# Patient Record
Sex: FEMALE | Race: OTHER | HISPANIC OR LATINO
[De-identification: names, ages, dates, MRNs, and addresses within clinical notes are randomized per-mention and may not be internally consistent; named-entity substitution may affect disease eponyms.]

---
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
## 2020-06-04 NOTE — DISCHARGE NOTE ADULT - PATIENT PORTAL LINK FT
Spine appears normal, range of motion is not limited, no muscle or joint tenderness, legs with minimal edema, pulses intact b/l You can access the [facility] Patient Portal, offered by [facility], by registering with the following website: [email or web address]

## 2021-06-18 ENCOUNTER — EMERGENCY (EMERGENCY)
Facility: HOSPITAL | Age: 21
LOS: 1 days | Discharge: HOME | End: 2021-06-18
Attending: STUDENT IN AN ORGANIZED HEALTH CARE EDUCATION/TRAINING PROGRAM
Payer: MEDICAID

## 2021-06-18 VITALS
OXYGEN SATURATION: 99 % | RESPIRATION RATE: 16 BRPM | WEIGHT: 167.99 LBS | HEIGHT: 62 IN | HEART RATE: 58 BPM | SYSTOLIC BLOOD PRESSURE: 138 MMHG | DIASTOLIC BLOOD PRESSURE: 80 MMHG | TEMPERATURE: 99 F

## 2021-06-18 VITALS
HEART RATE: 93 BPM | TEMPERATURE: 98 F | RESPIRATION RATE: 16 BRPM | SYSTOLIC BLOOD PRESSURE: 104 MMHG | DIASTOLIC BLOOD PRESSURE: 63 MMHG | OXYGEN SATURATION: 97 %

## 2021-06-18 DIAGNOSIS — Z98.890 OTHER SPECIFIED POSTPROCEDURAL STATES: Chronic | ICD-10-CM

## 2021-06-18 PROBLEM — K66.1 HEMOPERITONEUM: Chronic | Status: ACTIVE | Noted: 2018-10-09

## 2021-06-18 PROBLEM — N83.209 UNSPECIFIED OVARIAN CYST, UNSPECIFIED SIDE: Chronic | Status: ACTIVE | Noted: 2018-10-09

## 2021-06-18 LAB
ALBUMIN SERPL ELPH-MCNC: 3.6 G/DL — SIGNIFICANT CHANGE UP (ref 3.5–5)
ALP SERPL-CCNC: 52 U/L — SIGNIFICANT CHANGE UP (ref 40–120)
ALT FLD-CCNC: 17 U/L DA — SIGNIFICANT CHANGE UP (ref 10–60)
ANION GAP SERPL CALC-SCNC: 5 MMOL/L — SIGNIFICANT CHANGE UP (ref 5–17)
APPEARANCE UR: CLEAR — SIGNIFICANT CHANGE UP
AST SERPL-CCNC: 11 U/L — SIGNIFICANT CHANGE UP (ref 10–40)
BASOPHILS # BLD AUTO: 0.11 K/UL — SIGNIFICANT CHANGE UP (ref 0–0.2)
BASOPHILS NFR BLD AUTO: 1.2 % — SIGNIFICANT CHANGE UP (ref 0–2)
BILIRUB SERPL-MCNC: 0.1 MG/DL — LOW (ref 0.2–1.2)
BILIRUB UR-MCNC: NEGATIVE — SIGNIFICANT CHANGE UP
BUN SERPL-MCNC: 7 MG/DL — SIGNIFICANT CHANGE UP (ref 7–18)
CALCIUM SERPL-MCNC: 8.9 MG/DL — SIGNIFICANT CHANGE UP (ref 8.4–10.5)
CHLORIDE SERPL-SCNC: 108 MMOL/L — SIGNIFICANT CHANGE UP (ref 96–108)
CO2 SERPL-SCNC: 27 MMOL/L — SIGNIFICANT CHANGE UP (ref 22–31)
COLOR SPEC: YELLOW — SIGNIFICANT CHANGE UP
CREAT SERPL-MCNC: 0.63 MG/DL — SIGNIFICANT CHANGE UP (ref 0.5–1.3)
D DIMER BLD IA.RAPID-MCNC: 200 NG/ML DDU — SIGNIFICANT CHANGE UP
DIFF PNL FLD: ABNORMAL
EOSINOPHIL # BLD AUTO: 0.11 K/UL — SIGNIFICANT CHANGE UP (ref 0–0.5)
EOSINOPHIL NFR BLD AUTO: 1.2 % — SIGNIFICANT CHANGE UP (ref 0–6)
EPI CELLS # UR: SIGNIFICANT CHANGE UP /HPF
GLUCOSE SERPL-MCNC: 72 MG/DL — SIGNIFICANT CHANGE UP (ref 70–99)
GLUCOSE UR QL: NEGATIVE — SIGNIFICANT CHANGE UP
HCG SERPL-ACNC: <1 MIU/ML — SIGNIFICANT CHANGE UP
HCT VFR BLD CALC: 40.9 % — SIGNIFICANT CHANGE UP (ref 34.5–45)
HGB BLD-MCNC: 13.3 G/DL — SIGNIFICANT CHANGE UP (ref 11.5–15.5)
IMM GRANULOCYTES NFR BLD AUTO: 0.2 % — SIGNIFICANT CHANGE UP (ref 0–1.5)
KETONES UR-MCNC: NEGATIVE — SIGNIFICANT CHANGE UP
LACTATE SERPL-SCNC: 1.3 MMOL/L — SIGNIFICANT CHANGE UP (ref 0.7–2)
LEUKOCYTE ESTERASE UR-ACNC: ABNORMAL
LIDOCAIN IGE QN: 135 U/L — SIGNIFICANT CHANGE UP (ref 73–393)
LYMPHOCYTES # BLD AUTO: 2.14 K/UL — SIGNIFICANT CHANGE UP (ref 1–3.3)
LYMPHOCYTES # BLD AUTO: 23.1 % — SIGNIFICANT CHANGE UP (ref 13–44)
MCHC RBC-ENTMCNC: 27 PG — SIGNIFICANT CHANGE UP (ref 27–34)
MCHC RBC-ENTMCNC: 32.5 GM/DL — SIGNIFICANT CHANGE UP (ref 32–36)
MCV RBC AUTO: 83.1 FL — SIGNIFICANT CHANGE UP (ref 80–100)
MONOCYTES # BLD AUTO: 0.78 K/UL — SIGNIFICANT CHANGE UP (ref 0–0.9)
MONOCYTES NFR BLD AUTO: 8.4 % — SIGNIFICANT CHANGE UP (ref 2–14)
NEUTROPHILS # BLD AUTO: 6.11 K/UL — SIGNIFICANT CHANGE UP (ref 1.8–7.4)
NEUTROPHILS NFR BLD AUTO: 65.9 % — SIGNIFICANT CHANGE UP (ref 43–77)
NITRITE UR-MCNC: NEGATIVE — SIGNIFICANT CHANGE UP
NRBC # BLD: 0 /100 WBCS — SIGNIFICANT CHANGE UP (ref 0–0)
PH UR: 5 — SIGNIFICANT CHANGE UP (ref 5–8)
PLATELET # BLD AUTO: 256 K/UL — SIGNIFICANT CHANGE UP (ref 150–400)
POTASSIUM SERPL-MCNC: 3.7 MMOL/L — SIGNIFICANT CHANGE UP (ref 3.5–5.3)
POTASSIUM SERPL-SCNC: 3.7 MMOL/L — SIGNIFICANT CHANGE UP (ref 3.5–5.3)
PROT SERPL-MCNC: 8.1 G/DL — SIGNIFICANT CHANGE UP (ref 6–8.3)
PROT UR-MCNC: NEGATIVE — SIGNIFICANT CHANGE UP
RAPID RVP RESULT: SIGNIFICANT CHANGE UP
RBC # BLD: 4.92 M/UL — SIGNIFICANT CHANGE UP (ref 3.8–5.2)
RBC # FLD: 14.7 % — HIGH (ref 10.3–14.5)
RBC CASTS # UR COMP ASSIST: SIGNIFICANT CHANGE UP /HPF (ref 0–2)
SARS-COV-2 RNA SPEC QL NAA+PROBE: SIGNIFICANT CHANGE UP
SODIUM SERPL-SCNC: 140 MMOL/L — SIGNIFICANT CHANGE UP (ref 135–145)
SP GR SPEC: 1.01 — SIGNIFICANT CHANGE UP (ref 1.01–1.02)
TROPONIN I SERPL-MCNC: <0.015 NG/ML — SIGNIFICANT CHANGE UP (ref 0–0.04)
UROBILINOGEN FLD QL: NEGATIVE — SIGNIFICANT CHANGE UP
WBC # BLD: 9.27 K/UL — SIGNIFICANT CHANGE UP (ref 3.8–10.5)
WBC # FLD AUTO: 9.27 K/UL — SIGNIFICANT CHANGE UP (ref 3.8–10.5)
WBC UR QL: SIGNIFICANT CHANGE UP /HPF (ref 0–5)

## 2021-06-18 PROCEDURE — 83690 ASSAY OF LIPASE: CPT

## 2021-06-18 PROCEDURE — 36415 COLL VENOUS BLD VENIPUNCTURE: CPT

## 2021-06-18 PROCEDURE — 81001 URINALYSIS AUTO W/SCOPE: CPT

## 2021-06-18 PROCEDURE — 84484 ASSAY OF TROPONIN QUANT: CPT

## 2021-06-18 PROCEDURE — 71046 X-RAY EXAM CHEST 2 VIEWS: CPT

## 2021-06-18 PROCEDURE — 99284 EMERGENCY DEPT VISIT MOD MDM: CPT | Mod: 25

## 2021-06-18 PROCEDURE — 83605 ASSAY OF LACTIC ACID: CPT

## 2021-06-18 PROCEDURE — 0225U NFCT DS DNA&RNA 21 SARSCOV2: CPT

## 2021-06-18 PROCEDURE — 85025 COMPLETE CBC W/AUTO DIFF WBC: CPT

## 2021-06-18 PROCEDURE — 93005 ELECTROCARDIOGRAM TRACING: CPT

## 2021-06-18 PROCEDURE — 96374 THER/PROPH/DIAG INJ IV PUSH: CPT

## 2021-06-18 PROCEDURE — 84702 CHORIONIC GONADOTROPIN TEST: CPT

## 2021-06-18 PROCEDURE — 96375 TX/PRO/DX INJ NEW DRUG ADDON: CPT

## 2021-06-18 PROCEDURE — 71046 X-RAY EXAM CHEST 2 VIEWS: CPT | Mod: 26

## 2021-06-18 PROCEDURE — 99285 EMERGENCY DEPT VISIT HI MDM: CPT

## 2021-06-18 PROCEDURE — 85379 FIBRIN DEGRADATION QUANT: CPT

## 2021-06-18 PROCEDURE — 80053 COMPREHEN METABOLIC PANEL: CPT

## 2021-06-18 RX ORDER — SODIUM CHLORIDE 9 MG/ML
1000 INJECTION INTRAMUSCULAR; INTRAVENOUS; SUBCUTANEOUS ONCE
Refills: 0 | Status: COMPLETED | OUTPATIENT
Start: 2021-06-18 | End: 2021-06-18

## 2021-06-18 RX ORDER — KETOROLAC TROMETHAMINE 30 MG/ML
15 SYRINGE (ML) INJECTION ONCE
Refills: 0 | Status: DISCONTINUED | OUTPATIENT
Start: 2021-06-18 | End: 2021-06-18

## 2021-06-18 RX ORDER — ONDANSETRON 8 MG/1
4 TABLET, FILM COATED ORAL ONCE
Refills: 0 | Status: COMPLETED | OUTPATIENT
Start: 2021-06-18 | End: 2021-06-18

## 2021-06-18 RX ORDER — FAMOTIDINE 10 MG/ML
20 INJECTION INTRAVENOUS ONCE
Refills: 0 | Status: COMPLETED | OUTPATIENT
Start: 2021-06-18 | End: 2021-06-18

## 2021-06-18 RX ADMIN — Medication 15 MILLIGRAM(S): at 15:18

## 2021-06-18 RX ADMIN — SODIUM CHLORIDE 2000 MILLILITER(S): 9 INJECTION INTRAMUSCULAR; INTRAVENOUS; SUBCUTANEOUS at 13:55

## 2021-06-18 RX ADMIN — Medication 15 MILLIGRAM(S): at 14:48

## 2021-06-18 RX ADMIN — ONDANSETRON 4 MILLIGRAM(S): 8 TABLET, FILM COATED ORAL at 13:55

## 2021-06-18 RX ADMIN — SODIUM CHLORIDE 1000 MILLILITER(S): 9 INJECTION INTRAMUSCULAR; INTRAVENOUS; SUBCUTANEOUS at 15:42

## 2021-06-18 RX ADMIN — FAMOTIDINE 20 MILLIGRAM(S): 10 INJECTION INTRAVENOUS at 13:55

## 2021-06-18 NOTE — ED ADULT TRIAGE NOTE - CHIEF COMPLAINT QUOTE
shortness of breath with diarrhea/ vomiting, + hemoptysis, back pains abdominal pain with diarrhea/ vomiting, back pains, slight SOB (non tachypneic), + throat pain

## 2021-06-18 NOTE — ED PROVIDER NOTE - OBJECTIVE STATEMENT
21 year old female no significant past history presents with 3-4 days of viral symptoms.  Patient reports that patient returned from [place] 1 week ago and for the last 3-4 days has been having multiple symptoms including tactile fevers, chills, night sweats, headaches, vertigo, feeling lightheaded, NBNB vomiting / nausea, non-bloody diarrhea, back pain, and chest pain.  Patient also states that she has been having a productive cough with blood in the sputum.  Patient denies any calf pain and denies taking any birth control.  Patient states she is not vaccinated for COVID-19.  LMP 5/25.

## 2021-06-18 NOTE — ED PROVIDER NOTE - PROGRESS NOTE DETAILS
Patient reports feeling much better.  Workup negative with exception of RVP.  Patient advised to self isolate until she receives a negative covid result.  Patient nontoxic and medically stable for discharge. Results discussed with patient. Return precautions provided and patient understands to return to the ED for concerning or worsening signs and symptoms. Instructed to follow up with primary care physician and agreeable. Patient's questions answered.

## 2021-06-18 NOTE — ED PROVIDER NOTE - NSFOLLOWUPINSTRUCTIONS_ED_ALL_ED_FT
Viral Illness, Adult    Viruses are tiny germs that can get into a person's body and cause illness. There are many different types of viruses, and they cause many types of illness. Viral illnesses can range from mild to severe. They can affect various parts of the body.    Treatment for a viral illness may include:    Resting and drinking plenty of fluids. Medicines to relieve symptoms. These can include over-the-counter medicine for pain and fever, medicines for cough or congestion, and medicines to relieve diarrhea.         Take over-the-counter and prescription medicines only as told by your health care provider.If you were prescribed an antiviral medicine, take it as told by your health care provider. Do not stop taking the medicine even if you start to feel better.Be aware of when antibiotics are needed and when they are not needed. Antibiotics do not treat viruses.     Do not ask for an antibiotic prescription if you have been diagnosed with a viral illness. That will not make your illness go away faster.Frequently taking antibiotics when they are not needed can lead to antibiotic resistance. When this develops, the medicine no longer works against the bacteria that it normally fights.    General instructions     Drink enough fluids to keep your urine clear or pale yellow. Rest as much as possible. Return to your normal activities as told by your health care provider. Ask your health care provider what activities are safe for you. Keep all follow-up visits as told by your health care provider. This is important.    Take these actions to reduce your risk of viral infection:    Eat a healthy diet and get enough rest. Wash your hands often with soap and water. This is especially important when you are in public places. If soap and water are not available, use hand . Avoid close contact with friends and family who have a viral illness.     Contact a health care provider if:    You have symptoms of a viral illness that do not go away. Your symptoms come back after going away Your symptoms get worse.     Get help right away if:    You have trouble breathing. You have a severe headache or a stiff neck. You have severe vomiting or abdominal pain. This information is not intended to replace advice given to you by your health care provider. Make sure you discuss any questions you have with your health care provider.

## 2021-06-18 NOTE — ED PROVIDER NOTE - PATIENT PORTAL LINK FT
You can access the FollowMyHealth Patient Portal offered by [facility] by registering at the following website: [email or web address]. By joining [employer]’s FollowMyHealth portal, you will also be able to view your health information using other applications (apps) compatible with our system.

## 2021-06-18 NOTE — ED ADULT NURSE NOTE - NSIMPLEMENTINTERV_GEN_ALL_ED
Implemented All Universal Safety Interventions:  [place] to call system. Call bell, personal items and telephone within reach. Instruct patient to call for assistance. Room bathroom lighting operational. Non-slip footwear when patient is off stretcher. Physically safe environment: no spills, clutter or unnecessary equipment. Stretcher in lowest position, wheels locked, appropriate side rails in place.

## 2021-06-18 NOTE — ED PROVIDER NOTE - PHYSICAL EXAMINATION
GEN:   comfortable, in no apparent distress, AOx3  CV:   regular rate and rhythm, normal S1/S2, no murmur  RESP:   clear to auscultation bilaterally, non-labored, speaking in full sentences  ABD:   soft, no guarding, b/l diffuse lower abd pain.  :   no cva tenderness  MSK:   no musculoskeletal tenderness, 5/5 strength, moving all extremities  SKIN:   dry, intact, no rash  NEURO:   AOx3, no focal weakness or loss of sensation, gait normal, GCS 15  PSYCH: calm, cooperative, no apparent risk to self and others

## 2021-06-18 NOTE — ED PROVIDER NOTE - CLINICAL SUMMARY MEDICAL DECISION MAKING FREE TEXT BOX
21 year old female no significant past history presents with symptoms as described above.  Likely viral in nature, however given ? hemoptysis unable to r/o PE with PERC.  Will check labs including RVP, d-dimer and trop, CXR, IV hydration, meds and reassess.

## 2021-06-18 NOTE — ED ADULT NURSE NOTE - OBJECTIVE STATEMENT
Pt c/o fever, chills, headache, dizziness with N/V/D, back and chest pain x 3-4 days. Pt returned from [place] 1 week ago

## 2021-07-27 ENCOUNTER — EMERGENCY (EMERGENCY)
Facility: HOSPITAL | Age: 21
LOS: 1 days | Discharge: HOME | End: 2021-07-27
Attending: EMERGENCY MEDICINE | Admitting: EMERGENCY MEDICINE
Payer: MEDICAID

## 2021-07-27 VITALS
HEART RATE: 88 BPM | TEMPERATURE: 100 F | OXYGEN SATURATION: 100 % | DIASTOLIC BLOOD PRESSURE: 90 MMHG | HEIGHT: 62 IN | RESPIRATION RATE: 18 BRPM | SYSTOLIC BLOOD PRESSURE: 141 MMHG

## 2021-07-27 DIAGNOSIS — Z98.890 OTHER SPECIFIED POSTPROCEDURAL STATES: Chronic | ICD-10-CM

## 2021-07-27 PROCEDURE — 99285 EMERGENCY DEPT VISIT HI MDM: CPT

## 2021-07-27 NOTE — ED ADULT TRIAGE NOTE - CHIEF COMPLAINT QUOTE
AS per EMS mother called ambulance saying that she texted her sister expressing suicidal thought. AS per uncle she is not acting normal. Pt says she feels depressed. Has seen a psychiatrist in the past .  Pt denies suicidal or homicidal thoughts. Upon triage pt is febrile with 100.3 and pt says she has a headache and she feels tired.

## 2021-07-28 VITALS
DIASTOLIC BLOOD PRESSURE: 72 MMHG | OXYGEN SATURATION: 100 % | RESPIRATION RATE: 18 BRPM | SYSTOLIC BLOOD PRESSURE: 124 MMHG | HEART RATE: 90 BPM

## 2021-07-28 DIAGNOSIS — F12.10 CANNABIS ABUSE, UNCOMPLICATED: ICD-10-CM

## 2021-07-28 DIAGNOSIS — F41.9 ANXIETY DISORDER, UNSPECIFIED: ICD-10-CM

## 2021-07-28 DIAGNOSIS — F32.9 MAJOR DEPRESSIVE DISORDER, SINGLE EPISODE, UNSPECIFIED: ICD-10-CM

## 2021-07-28 LAB
ALBUMIN SERPL ELPH-MCNC: 4.6 G/DL — SIGNIFICANT CHANGE UP (ref 3.3–5)
ALP SERPL-CCNC: 63 U/L — SIGNIFICANT CHANGE UP (ref 40–120)
ALT FLD-CCNC: 8 U/L — SIGNIFICANT CHANGE UP (ref 4–33)
ANION GAP SERPL CALC-SCNC: 21 MMOL/L — HIGH (ref 7–14)
APAP SERPL-MCNC: <15 UG/ML — SIGNIFICANT CHANGE UP (ref 15–25)
AST SERPL-CCNC: 16 U/L — SIGNIFICANT CHANGE UP (ref 4–32)
B PERT DNA SPEC QL NAA+PROBE: SIGNIFICANT CHANGE UP
BILIRUB SERPL-MCNC: 0.7 MG/DL — SIGNIFICANT CHANGE UP (ref 0.2–1.2)
BUN SERPL-MCNC: 7 MG/DL — SIGNIFICANT CHANGE UP (ref 7–23)
C PNEUM DNA SPEC QL NAA+PROBE: SIGNIFICANT CHANGE UP
CALCIUM SERPL-MCNC: 9.6 MG/DL — SIGNIFICANT CHANGE UP (ref 8.4–10.5)
CHLORIDE SERPL-SCNC: 101 MMOL/L — SIGNIFICANT CHANGE UP (ref 98–107)
CO2 SERPL-SCNC: 17 MMOL/L — LOW (ref 22–31)
CREAT SERPL-MCNC: 0.72 MG/DL — SIGNIFICANT CHANGE UP (ref 0.5–1.3)
ETHANOL SERPL-MCNC: <10 MG/DL — SIGNIFICANT CHANGE UP
FLUAV SUBTYP SPEC NAA+PROBE: SIGNIFICANT CHANGE UP
FLUBV RNA SPEC QL NAA+PROBE: SIGNIFICANT CHANGE UP
GLUCOSE SERPL-MCNC: 84 MG/DL — SIGNIFICANT CHANGE UP (ref 70–99)
HADV DNA SPEC QL NAA+PROBE: SIGNIFICANT CHANGE UP
HCOV 229E RNA SPEC QL NAA+PROBE: SIGNIFICANT CHANGE UP
HCOV HKU1 RNA SPEC QL NAA+PROBE: SIGNIFICANT CHANGE UP
HCOV NL63 RNA SPEC QL NAA+PROBE: SIGNIFICANT CHANGE UP
HCOV OC43 RNA SPEC QL NAA+PROBE: SIGNIFICANT CHANGE UP
HCT VFR BLD CALC: 36.6 % — SIGNIFICANT CHANGE UP (ref 34.5–45)
HGB BLD-MCNC: 11.8 G/DL — SIGNIFICANT CHANGE UP (ref 11.5–15.5)
HMPV RNA SPEC QL NAA+PROBE: SIGNIFICANT CHANGE UP
HPIV1 RNA SPEC QL NAA+PROBE: SIGNIFICANT CHANGE UP
HPIV2 RNA SPEC QL NAA+PROBE: SIGNIFICANT CHANGE UP
HPIV3 RNA SPEC QL NAA+PROBE: SIGNIFICANT CHANGE UP
HPIV4 RNA SPEC QL NAA+PROBE: SIGNIFICANT CHANGE UP
MCHC RBC-ENTMCNC: 26.7 PG — LOW (ref 27–34)
MCHC RBC-ENTMCNC: 32.2 GM/DL — SIGNIFICANT CHANGE UP (ref 32–36)
MCV RBC AUTO: 82.8 FL — SIGNIFICANT CHANGE UP (ref 80–100)
NRBC # BLD: 0 /100 WBCS — SIGNIFICANT CHANGE UP
NRBC # FLD: 0 K/UL — SIGNIFICANT CHANGE UP
PLATELET # BLD AUTO: 291 K/UL — SIGNIFICANT CHANGE UP (ref 150–400)
POTASSIUM SERPL-MCNC: 3.7 MMOL/L — SIGNIFICANT CHANGE UP (ref 3.5–5.3)
POTASSIUM SERPL-SCNC: 3.7 MMOL/L — SIGNIFICANT CHANGE UP (ref 3.5–5.3)
PROT SERPL-MCNC: 7.9 G/DL — SIGNIFICANT CHANGE UP (ref 6–8.3)
RAPID RVP RESULT: DETECTED
RBC # BLD: 4.42 M/UL — SIGNIFICANT CHANGE UP (ref 3.8–5.2)
RBC # FLD: 13.9 % — SIGNIFICANT CHANGE UP (ref 10.3–14.5)
RSV RNA SPEC QL NAA+PROBE: SIGNIFICANT CHANGE UP
RV+EV RNA SPEC QL NAA+PROBE: DETECTED
SALICYLATES SERPL-MCNC: <5 MG/DL — LOW (ref 15–30)
SARS-COV-2 RNA SPEC QL NAA+PROBE: SIGNIFICANT CHANGE UP
SODIUM SERPL-SCNC: 139 MMOL/L — SIGNIFICANT CHANGE UP (ref 135–145)
TOXICOLOGY SCREEN, DRUGS OF ABUSE, SERUM RESULT: SIGNIFICANT CHANGE UP
TSH SERPL-MCNC: 0.95 UIU/ML — SIGNIFICANT CHANGE UP (ref 0.27–4.2)
WBC # BLD: 9.48 K/UL — SIGNIFICANT CHANGE UP (ref 3.8–10.5)
WBC # FLD AUTO: 9.48 K/UL — SIGNIFICANT CHANGE UP (ref 3.8–10.5)

## 2021-07-28 PROCEDURE — 90792 PSYCH DIAG EVAL W/MED SRVCS: CPT

## 2021-07-28 PROCEDURE — 71045 X-RAY EXAM CHEST 1 VIEW: CPT | Mod: 26

## 2021-07-28 RX ORDER — ACETAMINOPHEN 500 MG
650 TABLET ORAL ONCE
Refills: 0 | Status: COMPLETED | OUTPATIENT
Start: 2021-07-28 | End: 2021-07-28

## 2021-07-28 RX ADMIN — Medication 650 MILLIGRAM(S): at 01:44

## 2021-07-28 NOTE — ED PROVIDER NOTE - PROGRESS NOTE DETAILS
Dr. Bobby Dominguez DO (ED ATTENDING):  CXR clear. negative for COVID19 but + other respiratory virus on RVP likely responsible for patient symptoms. Pt seen by psychiatry team in ED, safety plan developed, and deemed stable for d/c home with crisis center f/u
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
## 2021-07-28 NOTE — ED PROVIDER NOTE - PATIENT PORTAL LINK FT
You can access the FollowMyHealth Patient Portal offered by [facility] by registering at the following website: [email or web address]. By joining [facility]’s FollowMyHealth portal, you will also be able to view your health information using other applications (apps) compatible with our system.

## 2021-07-28 NOTE — BH SAFETY PLAN - DISTRACTION PHONE 1
Called and spoke with patient States doing well  but fatigued   Pain medications are adequate   States read Medical pamphlet but has not gone online and received her registration number  Explained step by step process to her Will follow up by phone next week to check her progress    Will schedule an appointment with one of our certified providers once she gets her registration number
[phone]

## 2021-07-28 NOTE — ED PROVIDER NOTE - ATTENDING CONTRIBUTION TO CARE
22yo F with no PMHX, has had depression in past and seen psychologist but has no long term psychiatrist or therapist, presenting to ED after she texted sister that she was having thoughts of hurting herself without a plan.  Pt has had SI in past but no prior attempts, no access to firearms. No current SI, HI, auditory or visual hallucinations. Says her depression and prior SI has been due to her bad relationship with her mother who lives with her who she believes is controlling and lies to her.     Of note, patient has had 1 week of dry cough, myalgias, headaches, fatigue, fevers, n/v/d. She is not covid19 vaccinated.    General: Patient in no apparent distress, AAO x 3  Skin: Dry and intact  HEENT: Head atraumatic. Oral mucosa moist. No pharyngeal exudates or tonsillar enlargement  Eyes: Conjunctiva normal  Cardiac: Regular rhythm and rate. No pretibial edema b/l  Respiratory: Lungs clear b/l and symmetric. No respiratory distress. Able to speak in complete sentences.  Gastrointestinal: Abdomen soft, nondistended, nontender  Musculoskeletal: Moves all extremities spontaneously  Neurological: alert and oriented to person, place, and time  Psychiatric: Cooperative and calm but intermittently agitated and tearful    a/p  SI + depression- call psych and get labs, safety plan  fevers and other sx- ?covid or other viral syndrome, well appearing pt. RVP, tylenol, labs, cxr
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
## 2021-07-28 NOTE — ED BEHAVIORAL HEALTH ASSESSMENT NOTE - RISK ASSESSMENT
Low Acute Suicide Risk RISK FACTORS:  Modifiable risk factors: depression, anxiety, passive SI   Unmodifiable risk factors: self reported hx of depression and anxiety, comorbid substance abuse, not receiving treatment, hx of trauma with intrusive symptoms  Protective factors: no hx of SA or any self injurious behaviors, Domiciled, future-oriented/ help seeking and able to safety plan, good support from boyfriend, has good/meaningful relationship with BF and feels a sense of responsibility towards him, endorses responsibility to BF  (as protective), no access to lethal means, denies (and no objective evidence of) suicidality, no complex medical issues or chronic pains, not acutely manic or floridly psychotic, no hx of violence or any pending legal cases, not intoxicated or in withdrawal, no family hx of SA, beloved pet    Given above, the Pt is currently at low acute suicide risk as well as not at chronically elevated risk of self-harm.   At this time,  there are no identifiable acute increase in risk(s) that would be mitigated by an involuntary psychiatric admission.  Pt was offered voluntary admission to in-patient unit but refused.  The Pt remains appropriate for current level of care. Modifiable risk factors will be addressed with once out-Pt treatment has been set up.

## 2021-07-28 NOTE — ED BEHAVIORAL HEALTH ASSESSMENT NOTE - REFERRAL / APPOINTMENT DETAILS
was provided contact details to our [facility]; given multiple community resources including substance abuse clinics like [facility], [facility], [facility]

## 2021-07-28 NOTE — ED BEHAVIORAL HEALTH ASSESSMENT NOTE - AXIS III
entero/rhinoviral infection  Hx of hemoperitoneum, 9/2018 - managed conservatively  hx of ovarian dysfunction

## 2021-07-28 NOTE — ED BEHAVIORAL HEALTH ASSESSMENT NOTE - DESCRIPTION
parents . father currently living in [place]. Pt has a 20 yr old sister and a 18 yr old half sister. currently, living with BF in the basement of mother's house. mother remarried and Pt's step father lives upstairs (along with the mother, grandmother). graduated with degree in [employer] arts at [employer], 2020. likes cooking, drawing,. has pet dog. She is not [employer] Hx of hemoperitoneum, 9/2018 - managed conservatively; hx of ovarian dysfunction Upon her arrival at the ED, she was immediately transferred to the main ED as she had slight fever (100.3).  on work up, Pt had entero/rhinovirus infection.  She was subsequently, medically cleared.  Pt has been calm and cooperative.  There has been no occurrence of agitation/aggressive behavior.  No reported verbalization of active/ passive SI/HI.   There are no signs/symptoms of severe MDD, acute kev or florid psychosis.  Pt is not showing any signs/symptoms of intoxication or withdrawal.  She is not delirious.  Pt has not tested limits.. Has maintained appropriate boundaries. Pt has been easily redirected.  Overall, there has been no management issues.      Vital Signs Last 24 Hrs  T(C): 37.6 (28 Jul 2021 01:03), Max: 37.9 (27 Jul 2021 21:55)  T(F): 99.7 (28 Jul 2021 01:03), Max: 100.3 (27 Jul 2021 21:55)  HR: 90 (28 Jul 2021 05:02) (88 - 100)  BP: 124/72 (28 Jul 2021 05:02) (124/72 - 141/90)  BP(mean): --  RR: 18 (28 Jul 2021 05:02) (16 - 18)  SpO2: 100% (28 Jul 2021 05:02) (100% - 100%)

## 2021-07-28 NOTE — ED BEHAVIORAL HEALTH ASSESSMENT NOTE - NSSUICPROTFACT_PSY_ALL_CORE
Responsibility to children, family, or others/Identifies reasons for living/Supportive social network of family or friends/Fear of death or the actual act of killing self/Beloved pets

## 2021-07-28 NOTE — ED PROVIDER NOTE - CLINICAL SUMMARY MEDICAL DECISION MAKING FREE TEXT BOX
20yo F h.o ovarian cysts BIBEMS for reported SI and AMS. Pt reports she has been under a lot of stress at home related to family issues and dispute with mother. Pt confronted mother about an ongoing issue today, afterwards attempted to leave shared household when mother called EMS. Pt denies ever having SI or HI, however admits to being more anxious and depressed. Pt has not been eating or sleeping well over the past few days because of the issues at home. She reports she feels safe and has a support system including a fiance who is very present and supportive.   Noted to have fever at triage, pt denies subj fever. Has no cough, SOB, HA, abd pain. ROS otherwise negative.  - Psych consult  - Psych labs  - Safety plan
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
## 2021-07-28 NOTE — ED BEHAVIORAL HEALTH ASSESSMENT NOTE - SAFETY PLAN ADDT'L DETAILS
Safety plan discussed with.../Education provided regarding environmental safety / lethal means restriction/Provision of National Suicide Prevention Lifeline 9-917-070-POAA (1443)

## 2021-07-28 NOTE — ED PROVIDER NOTE - CARE PLAN
Principal Discharge DX:	Fever, unspecified fever cause   Principal Discharge DX:	Viral syndrome  Secondary Diagnosis:	Suicidal ideation  Secondary Diagnosis:	Depression

## 2021-07-28 NOTE — ED PROVIDER NOTE - NSFOLLOWUPINSTRUCTIONS_ED_ALL_ED_FT
You were seen and evaluated today for a psychiatric evaluation. We have provided you with information for outpatient follow up and crisis center, please utilize these resources as they are important for your progress.     You were also found to have a fever caused by the common cold. Please be sure to wash your hands well to prevent spread. If you develop a runny nose or cough, this is normal. Your COVID test was negative.     Please return to the emergency department if you experience:   - Difficulty breathing   - Chest pain  - Thoughts of harming yourself or anyone else
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
## 2021-07-29 NOTE — ED BEHAVIORAL HEALTH NOTE - BEHAVIORAL HEALTH NOTE
JOSE CRUZ HIGH RISK FOLLOW UP:     Writer contacted pt at [phone] for high risk follow up. Writer received VM with mailbox full.

## 2021-07-30 NOTE — ED BEHAVIORAL HEALTH NOTE - BEHAVIORAL HEALTH NOTE
HIGH RISK FOLLOW UP:  SW attempted to reach patient at [phone] with no success. Patient's mailbox full, unable to leave message.

## 2021-08-02 NOTE — ED BEHAVIORAL HEALTH NOTE - BEHAVIORAL HEALTH NOTE
HIGH RISK FOLLOW UP:  SW attempted to reach patient at [phone] with no success. Patient's mailbox full, unable to leave message.

## 2021-09-21 NOTE — ED ADULT TRIAGE NOTE - SPO2 (%)
HPI:    Jose Thomas is a 79year old male here today for hospital follow up.    He was discharged from Inpatient hospital, [facility]  to [address] [place] [address] 99

## 2022-08-19 NOTE — ED ADULT NURSE NOTE - NS ED NURSE RECORD ANOTHER VITAL SIGN
Date: August 19, 2022  Time In: 9:58am  Time Out: 10:51am      PROGRESS NOTE  Data:  Jet Cruz is a 11 y.o. male who presents today for individual therapy session through the [facility]. This provider is located at the [facility]; [address] [place] [address]. The Patient is seen remotely at home ([place], [place]), using [employer]. Patient is being seen via telehealth and stated they are in a secure environment for this session. The patient's condition being diagnosed/treated is appropriate for telemedicine. The provider identified herself as well as her credentials. The patient and/or patients guardian consent to be seen remotely, and when consent is given they understand that the consent allows for patient identifiable information to be sent to a third party as needed. They may refuse to be seen remotely at any time. The electronic data is encrypted and password protected, and the patient has been advised of the potential risks to privacy not withstanding such measures.     Patient presents this date for ADHD and ODD. Patient had a difficult time with focus this date as he frequently appeared as if he was tired and not fully paying attention. He often didn't understand and often asked to have questions repeated. This is abnormal for the previous 2 - 3 sessions with patient.     Patient and his grandmother discuss a jerk with his arms toward his chest that he has when playing his video games. He states that it is out of excitement and feels as if it is controllable. However, patient has made no attempts to control the behavior and is unsure if he is able to do so. Patient's grandmother voices concern for his reactions such as the jerk as well as bedwetting. She states that this has remained a consistent thing since his childhood that he has not fully outgrown.     Patient discusses the relationship with his mother and her pregnancy. Patient previously felt as if he shouldn't visit  his mother because his presence made the dog hyper which may stress his mother during her pregnancy. He denies having those beliefs any longer and states that he has begun to visit again. He also states that he goes with intentions of spending time with his mother however he spends most of his time in his room playing on his phone while she socializes with her peers instead. He has addressed this issue with his mother who denies not spending time with him. He also denies that this lack of quality time bothers him, however it was relevant in discussion.     Clinical Maneuvering/Intervention:  Patient in processing above session content; acknowledged and normalized patient’s thoughts, feelings, and concerns. Rationalized patient thought process regarding distractablility. Discussed triggers associated with patient's ADHD and ODD. Also discussed coping skills for patient to implement.    Allowed patient to freely discuss issues without interruption or judgment. Provided safe, confidential environment to facilitate the development of positive therapeutic relationship and encourage open, honest communication. Assisted patient in identifying risk factors which would indicate the need for higher level of care including thoughts to harm self or others and/or self-harming behavior and encouraged patient to contact this office, call 911, or present to the nearest emergency room should any of these events occur. Discussed crisis intervention services and means to access. Patient adamantly and convincingly denies current suicidal or homicidal ideation or perceptual disturbance.    Assessment   Patient appears to maintain relative stability as compared to their baseline. However, patient continues to struggle with ADHD and ODD which continues to cause impairment in important areas of functioning. A result, they can be reasonably expected to continue to benefit from treatment and would likely be at increased risk for  decompensation otherwise.    Mental Status Exam:   Hygiene:   good  Cooperation:  Cooperative  Eye Contact:  Good  Psychomotor Behavior:  Appropriate  Affect:  Appropriate  Mood: normal  Speech:  Normal  Thought Process:  Goal oriented and easily distracted   Thought Content:  Mood congruent  Suicidal:  None  Homicidal:  None  Hallucinations:  None  Delusion:  None  Memory:  Intact  Orientation:  Person, Place, Time and Situation  Reliability:  good  Insight:  Fair  Judgement:  Fair  Impulse Control:  Poor  Physical/Medical Issues:  No      PHQ-Score Total:  PHQ-9 Total Score:   OLIVER-7 Total Score:       Patient's Support Network Includes:  grandparents    Functional Status: Moderate impairment     Progress toward goal: Not at goal    Prognosis: Fair with Ongoing Treatment          Plan     Patient will continue in individual outpatient therapy with focus on improved functioning and coping skills, maintaining stability, and avoiding decompensation and the need for higher level of care.    Patient will adhere to any medication regimens as prescribed and report any side effects. Patient will contact this office, call 911 or present to the nearest emergency room should suicidal or homicidal ideations occur. Provide cognitive behavioral therapy and solution focused therapy to improve functioning, maintain stability and avoid decompensation and the need for higher level of care.     Return in about 4 weeks, or earlier if symptoms worsen or fail to improve.           VISIT DIAGNOSIS:     ICD-10-CM ICD-9-CM   1. ADHD (attention deficit hyperactivity disorder), combined type  F90.2 314.01   2. Oppositional defiant disorder  F91.3 313.81            This document has been electronically signed by Jose L Singer, LPCC-S, [facility]  August 19, 2022 11:05 EDT     Yes

## 2023-01-30 NOTE — ED PROVIDER NOTE - CPE EDP PSYCH NORM
Tomas Levine is a 13 y.o. female that presents for Medication Problem      Anxiety     HPI:    Inciting events or triggers for anxiety - school   Frequency of anxiety - daily    Side effects from Buspar, feels disassociated, and more depressed since taking it. Hasn't started Lexapro. Unmotivated when taking the Buspar. Felt like moods are worse. No thoughts of hurting self or others. Sleeping ok      Physical Exam    /62   Pulse 79   Temp 97.3 °F (36.3 °C) (Temporal)   Resp 14   Ht 4' 11\" (1.499 m)   Wt (!) 88 lb 3.2 oz (40 kg)   SpO2 96%   BMI 17.81 kg/m²   Appearance: alert, well appearing, and in no distress, normal appearing weight and well hydrated. CVS exam: normal rate, regular rhythm, normal S1, S2, no murmurs, rubs, clicks or gallops. Lungs: clear to auscultation, no wheezes, rales or rhonchi, symmetric air entry. Skin exam - normal coloration and turgor, no rashes, no suspicious skin lesions noted. Psych:  Affect appropriate. Thought process is normal without evidence of depression or psychosis. Good insight and appropriae interaction. Cognition and memory appear to be intact. ASSESSMENT & PLAN  Drake Regalado was seen today for medication problem. Diagnoses and all orders for this visit:    VINCE (generalized anxiety disorder)  Stop buspar, listed as side effect. Start Lexapro, has FU 2/15/2022. Has paperwork to see therapist/counselor at school. - - -

## 2023-02-21 NOTE — ED BEHAVIORAL HEALTH ASSESSMENT NOTE - NSBHPSYCHOLCOGORIENT_PSY_A_CORE
[place] at [place], updated on POC, denies further needs at this time. Chart flagged for ERP   Oriented to time, place, person, situation

## 2023-03-10 NOTE — ED PROVIDER NOTE - CPE EDP GASTRO NORM
normal... Rhofade Pregnancy And Lactation Text: This medication has not been assigned a Pregnancy Risk Category. It is unknown if the medication is excreted in breast milk.

## 2023-03-15 NOTE — ED BEHAVIORAL HEALTH ASSESSMENT NOTE - OTHER PAST PSYCHIATRIC HISTORY (INCLUDE DETAILS REGARDING ONSET, COURSE OF ILLNESS, INPATIENT/OUTPATIENT TREATMENT)
self reports hx of depression and anxiety  no prior psych admissions  per chart review and Psyckes: 1 prior Damián's ED visit for evaluation of SI last 4/11/2014   - was subsequently discharged during that encounter   no Ps Xelradhaz Pregnancy And Lactation Text: This medication is Pregnancy Category D and is not considered safe during pregnancy.  The risk during breast feeding is also uncertain.

## 2023-06-18 ENCOUNTER — EMERGENCY (EMERGENCY)
Facility: HOSPITAL | Age: 23
LOS: 1 days | Discharge: LEFT BEFORE DISCHARGE | End: 2023-06-18
Attending: STUDENT IN AN ORGANIZED HEALTH CARE EDUCATION/TRAINING PROGRAM
Payer: MEDICAID

## 2023-06-18 VITALS
HEART RATE: 78 BPM | TEMPERATURE: 98 F | HEIGHT: 63 IN | SYSTOLIC BLOOD PRESSURE: 118 MMHG | OXYGEN SATURATION: 100 % | RESPIRATION RATE: 16 BRPM | DIASTOLIC BLOOD PRESSURE: 80 MMHG | WEIGHT: 130.95 LBS

## 2023-06-18 DIAGNOSIS — Z98.890 OTHER SPECIFIED POSTPROCEDURAL STATES: Chronic | ICD-10-CM

## 2023-06-18 PROCEDURE — 82962 GLUCOSE BLOOD TEST: CPT

## 2023-06-18 PROCEDURE — 93005 ELECTROCARDIOGRAM TRACING: CPT

## 2023-06-18 PROCEDURE — L9991: CPT

## 2023-06-18 PROCEDURE — 93010 ELECTROCARDIOGRAM REPORT: CPT

## 2023-06-18 NOTE — ED ADULT TRIAGE NOTE - NS ED NURSE DIRECT TO ROOM YN
"Patient: Raquel Gonsales    Procedure Summary     Date Anesthesia Start Anesthesia Stop Room / Location    01/11/18 0733 1058  [facility] / [facility]       Procedure Diagnosis Surgeon Provider    L2-3 repeat laminectomy and fusion with instrumentation (N/A Spine Lumbar) Spinal stenosis of lumbar region with neurogenic claudication; Decreased mobility  (Spinal stenosis of lumbar region with neurogenic claudication [M48.062]; Decreased mobility [R26.89]) MD Nevaeh Bliss MD          Anesthesia Type: general  Last vitals  BP   146/79 (01/11/18 1200)   Temp   36.8 °C (98.2 °F) (01/11/18 1057)   Pulse   97 (01/11/18 1130)   Resp   16 (01/11/18 1200)     SpO2   99 % (01/11/18 1130)     Post Anesthesia Care and Evaluation    Patient location during evaluation: PACU  Patient participation: complete - patient participated  Level of consciousness: sleepy but conscious  Pain score: 0  Pain management: adequate  Airway patency: patent  Anesthetic complications: No anesthetic complications    Cardiovascular status: acceptable  Respiratory status: acceptable  Hydration status: acceptable    Comments: /79  Pulse 97  Temp 36.8 °C (98.2 °F) (Oral)   Resp 16  Ht 167.6 cm (66\")  Wt 116 kg (256 lb)  SpO2 99%  BMI 41.32 kg/m2        "
Yes

## 2023-06-18 NOTE — ED ADULT NURSE NOTE - EXPLANATION OF PATIENT'S REASON FOR LEAVING
' I cannot wait " " " how long does it take to see if im dehydrated ", I will got to another hospital , spoke to Dr Carpenter pt was told the doctor will be with her very soon, but decided to walk out

## 2023-06-18 NOTE — ED ADULT TRIAGE NOTE - BEFAST ARM SIDE DRIFT
Rx Refill Note  Requested Prescriptions     Pending Prescriptions Disp Refills   • isosorbide mononitrate (IMDUR) 60 MG 24 hr tablet [Pharmacy Med Name: ISOSORBIDE MONONITRATE 60MG ER TABS] 30 tablet 11     Sig: TAKE 1 TABLET BY MOUTH DAILY      Last office visit with prescribing clinician: 2/1/2022      Next office visit with prescribing clinician: 6/3/2022            Jason Greene MA  05/31/22, 10:08 EDT  
[place] Website --- [email or web address] website --- [email or web address]
No

## 2023-06-23 ENCOUNTER — EMERGENCY (EMERGENCY)
Facility: HOSPITAL | Age: 23
LOS: 1 days | Discharge: HOME | End: 2023-06-23
Attending: EMERGENCY MEDICINE
Payer: MEDICAID

## 2023-06-23 VITALS
OXYGEN SATURATION: 98 % | RESPIRATION RATE: 18 BRPM | HEART RATE: 71 BPM | TEMPERATURE: 99 F | DIASTOLIC BLOOD PRESSURE: 80 MMHG | HEIGHT: 63 IN | WEIGHT: 130.07 LBS | SYSTOLIC BLOOD PRESSURE: 118 MMHG

## 2023-06-23 VITALS
DIASTOLIC BLOOD PRESSURE: 71 MMHG | SYSTOLIC BLOOD PRESSURE: 108 MMHG | TEMPERATURE: 98 F | RESPIRATION RATE: 18 BRPM | OXYGEN SATURATION: 100 % | HEART RATE: 81 BPM

## 2023-06-23 DIAGNOSIS — Z98.890 OTHER SPECIFIED POSTPROCEDURAL STATES: Chronic | ICD-10-CM

## 2023-06-23 PROCEDURE — 99283 EMERGENCY DEPT VISIT LOW MDM: CPT | Mod: 25

## 2023-06-23 PROCEDURE — 96372 THER/PROPH/DIAG INJ SC/IM: CPT

## 2023-06-23 PROCEDURE — 99284 EMERGENCY DEPT VISIT MOD MDM: CPT

## 2023-06-23 RX ORDER — LIDOCAINE 4 G/100G
1 CREAM TOPICAL
Qty: 1 | Refills: 0
Start: 2023-06-23 | End: 2023-06-27

## 2023-06-23 RX ORDER — IBUPROFEN 200 MG
1 TABLET ORAL
Qty: 15 | Refills: 0
Start: 2023-06-23 | End: 2023-06-27

## 2023-06-23 RX ORDER — KETOROLAC TROMETHAMINE 30 MG/ML
30 SYRINGE (ML) INJECTION ONCE
Refills: 0 | Status: DISCONTINUED | OUTPATIENT
Start: 2023-06-23 | End: 2023-06-23

## 2023-06-23 RX ORDER — DIAZEPAM 5 MG
1 TABLET ORAL
Qty: 6 | Refills: 0
Start: 2023-06-23 | End: 2023-06-24

## 2023-06-23 RX ORDER — LIDOCAINE 4 G/100G
1 CREAM TOPICAL ONCE
Refills: 0 | Status: COMPLETED | OUTPATIENT
Start: 2023-06-23 | End: 2023-06-23

## 2023-06-23 RX ORDER — DIAZEPAM 5 MG
5 TABLET ORAL ONCE
Refills: 0 | Status: DISCONTINUED | OUTPATIENT
Start: 2023-06-23 | End: 2023-06-23

## 2023-06-23 RX ADMIN — LIDOCAINE 1 PATCH: 4 CREAM TOPICAL at 22:00

## 2023-06-23 RX ADMIN — Medication 30 MILLIGRAM(S): at 22:54

## 2023-06-23 RX ADMIN — Medication 5 MILLIGRAM(S): at 22:00

## 2023-06-23 RX ADMIN — Medication 30 MILLIGRAM(S): at 22:00

## 2023-06-23 NOTE — ED PROVIDER NOTE - PATIENT PORTAL LINK FT
You can access the FollowMyHealth Patient Portal offered by [facility] by registering at the following website: [email or web address]. By joining [facility]’s FollowMyHealth portal, you will also be able to view your health information using other applications (apps) compatible with our system.

## 2023-06-23 NOTE — ED ADULT TRIAGE NOTE - CHIEF COMPLAINT QUOTE
pain mid upper back x two weeks and today pain goes to left shoulder blade and sometimes fingers feels tingly, been lifting heavy while on job

## 2023-06-23 NOTE — ED PROVIDER NOTE - NEUROLOGICAL, MLM
Alert and oriented, no focal deficits, no motor or sensory deficits. Neurovascularly intact distally.

## 2023-06-23 NOTE — ED ADULT NURSE NOTE - PAIN RATING/NUMBER SCALE (0-10): ACTIVITY
May 6, 2022      Gala Cristopher  [address] [address]        Dear ,    We are writing to inform you of your test results.    Your cholesterol results continue to worsen with time.  This is not unusual.  Here is your risk for heart attack or stroke in the next 10 years according to 1 josh:  The 10-year ASCVD risk score (Jeff WISE Jr., et al., 2013) is: 6.1%    Values used to calculate the score:      Age: 65 years      Sex: Female      Is Non- : No      Diabetic: No      Tobacco smoker: No      Systolic Blood Pressure: 132 mmHg      Is BP treated: No      HDL Cholesterol: 85 mg/dL      Total Cholesterol: 297 mg/dL  So you see your risk is still pretty low at 6.1%.  If you lower your cholesterol it would be well under 5% I believe.  If you prefer to do a medication like red yeast rice extract faithfully, this has actually improved peoples numbers a bit.  Otherwise, a small amount of simvastatin, perhaps 20 mg, would do the trick.  Or even 5 or 10 mg of Crestor.  Let me know what you think.    Resulted Orders   Lipid panel reflex to direct LDL Fasting   Result Value Ref Range    Cholesterol 297 (H) <=199 mg/dL    Triglycerides 188 (H) <=149 mg/dL    Direct Measure HDL 85 >=50 mg/dL      Comment:      HDL Cholesterol Reference Range:     0-2 years:   No reference ranges established for patients under 2 years old  at [facility] for lipid analytes.    2-8 years:  Greater than 45 mg/dL     18 years and older:   Female: Greater than or equal to 50 mg/dL   Male:   Greater than or equal to 40 mg/dL    LDL Cholesterol Calculated 174 (H) <=129 mg/dL    Patient Fasting > 8hrs? Yes        If you have any questions or concerns, please call the clinic at the number listed above.       Sincerely,      Yessica Nolan MD           0 (no pain/absence of nonverbal indicators of pain)

## 2023-06-23 NOTE — ED PROVIDER NOTE - OBJECTIVE STATEMENT
23 year old female with no known medical problems presents to the ED with complaints of having mid scapular pain for x2 weeks. She states she can't move her shoulders and is very painful. She further reports sometimes her finger feels tingly. She lifts heavy stuff at work. No weakness. She has not been taking pain medications for pain. NKDA.

## 2023-06-23 NOTE — ED PROVIDER NOTE - PHYSICAL EXAMINATION
Musculoskeletal: Medial to the scapula, there is exquisite tenderness. She has passage full range of motion, no midline tenderness of the C spine and T spine, she has good strength.

## 2023-06-23 NOTE — ED ADULT NURSE NOTE - NSFALLUNIVINTERV_ED_ALL_ED
Bed/Stretcher in lowest position, wheels locked, appropriate side rails in place/Call bell, personal items and telephone in reach/Instruct patient to call for assistance before getting out of bed/chair/stretcher/Non-slip footwear applied when patient is off stretcher/[place] to call system/Physically safe environment - no spills, clutter or unnecessary equipment/Purposeful proactive rounding/Room/bathroom lighting operational, light cord in reach

## 2023-06-23 NOTE — ED ADULT NURSE NOTE - OBJECTIVE STATEMENT
Pt presents to ED AAOx4, c/o pain mid upper back x two weeks and today pain goes to left shoulder blade and sometimes fingers feels tingly, been lifting heavy while on job as per pt. Denies N/V/D, fever, chills, chest pain at this time.

## 2023-06-23 NOTE — ED PROVIDER NOTE - ATTESTATION, MLM
Private car I have reviewed and confirmed nurses' notes for patient's medications, allergies, medical history, and surgical history.

## 2023-06-27 PROBLEM — Z00.00 ENCOUNTER FOR PREVENTIVE HEALTH EXAMINATION: Status: ACTIVE | Noted: 2023-06-27

## 2023-06-28 ENCOUNTER — APPOINTMENT (OUTPATIENT)
Dept: ORTHOPEDIC SURGERY | Facility: CLINIC | Age: 23
End: 2023-06-28

## 2023-07-05 ENCOUNTER — APPOINTMENT (OUTPATIENT)
Dept: ORTHOPEDIC SURGERY | Facility: CLINIC | Age: 23
End: 2023-07-05

## 2023-08-07 NOTE — ED ADULT TRIAGE NOTE - SOURCE OF INFORMATION
Patient Protopic Pregnancy And Lactation Text: This medication is Pregnancy Category C. It is unknown if this medication is excreted in breast milk when applied topically.

## 2023-10-11 ENCOUNTER — EMERGENCY (EMERGENCY)
Facility: HOSPITAL | Age: 23
LOS: 1 days | Discharge: HOME | End: 2023-10-11
Attending: EMERGENCY MEDICINE
Payer: MEDICAID

## 2023-10-11 VITALS
WEIGHT: 121.47 LBS | OXYGEN SATURATION: 100 % | TEMPERATURE: 99 F | RESPIRATION RATE: 18 BRPM | SYSTOLIC BLOOD PRESSURE: 105 MMHG | HEIGHT: 62 IN | DIASTOLIC BLOOD PRESSURE: 64 MMHG | HEART RATE: 63 BPM

## 2023-10-11 DIAGNOSIS — Z98.890 OTHER SPECIFIED POSTPROCEDURAL STATES: Chronic | ICD-10-CM

## 2023-10-11 DIAGNOSIS — Z98.84 BARIATRIC SURGERY STATUS: Chronic | ICD-10-CM

## 2023-10-11 PROCEDURE — 99283 EMERGENCY DEPT VISIT LOW MDM: CPT | Mod: 25

## 2023-10-11 PROCEDURE — 99282 EMERGENCY DEPT VISIT SF MDM: CPT

## 2023-10-11 NOTE — ED PROVIDER NOTE - PHYSICAL EXAMINATION
Gen:  Awake, alert, NAD, WDWN, NCAT, non-toxic appearing.   Eyes:  PERRL, EOMI, no icterus, normal lids/lashes, normal conjunctivae.  ENT:  External inspection normal, pink/moist membranes.   CV:  Warm/well-perfused, no JVD. 2+ pulses rad/ulnar, normal cap refill.  Resp:  Normal respiratory rate/effort, no respiratory distress, normal voice, speaking full sentences, no retractions.  Abd:  Soft abdomen, no tender/distended/guarding/rebound, no pulsatile mass, no CVA tender.   Musculoskeletal:  N/V intact, FROM all 4 extremities, stable gait. L Hand:  FROM including flex/ext/abd/add/opp including FDP & FDS, no prince tender/deformity, no snuffbox tender.   Neck:  FROM neck, trachea midline.  Skin:  Color normal for race, warm and dry, no rash to visible skin regions. L hand palmar surface 1.5cm linear laceration w mild wound dehiscence. No surrounding erythema, induration, fluctuance, crepitus, bleeding, purulent discharge, or lymphangitic streaking.   Neuro:  Oriented x3, CN 2-12 intact (grossly), normal motor (grossly), normal sensory (grossly), normal gait.  Psych:  Attention normal. Affect normal. Behavior normal. Judgment normal.

## 2023-10-11 NOTE — ED PROVIDER NOTE - OBJECTIVE STATEMENT
23 female with hx of no known medical problems.   Pt presenting to the ED reporting left hand laceration ~1 week ago.  Patient reports that she was at work and it opened up with minimal bleeding and sent in for evaluation.  No further trauma.  Tetanus PPx UTD.  No other injury, no other complaints.

## 2023-10-11 NOTE — ED ADULT NURSE NOTE - PAIN: BODY LOCATION
hand
65 yr old female HTN, HDL postmenopausal bleeding. Pt had spotting on and off for sometime seen by gyn and now in PST. Pt scheduled for Operative hysteroscopy D&C on 11/2/2022.

## 2023-10-11 NOTE — ED PROVIDER NOTE - NSFOLLOWUPINSTRUCTIONS_ED_ALL_ED_FT
Please follow up with your PMD or Medicine Clinic in 2-3 days.  Return to the ER for worsening or concerning symptoms.  Keep wound clean and dry.    - - - - - - - - - - - - -    Laceration Care, Adult  A laceration is a cut that may go through all layers of the skin and into the tissue that is right under the skin. Some lacerations heal on their own. Others need to be closed with stitches (sutures), staples, skin adhesive strips, or skin glue.    Proper care of a laceration reduces the risk for infection, helps the laceration heal better, and may prevent scarring.    General tips  Keep the wound clean and dry.  Do not scratch or pick at the wound.  Wash your hands with soap and water for at least 20 seconds before and after touching your wound or changing your bandage (dressing). If soap and water are not available, use hand .  Do not usedisinfectants or antiseptics, such as rubbing alcohol, to clean your wound unless told by your health care provider.  If you were given a dressing, you should change it at least once a day, or as told by your health care provider. You should also change it if it becomes wet or dirty.  How to care for your laceration  If sutures or staples were used:    Keep the wound completely dry for the first 24 hours, or as told by your health care provider. After that time, you may shower or bathe. Do not soak your wound in water until after the sutures or staples have been removed.  Clean the wound once each day, or as told by your health care provider. To do this:  Wash the wound with soap and water.  Rinse the wound with water to remove all soap.  Pat the wound dry with a clean towel. Do not rub the wound.  After cleaning the wound, apply a thin layer of antibiotic ointment, other topical ointments, or a non-adherent dressing as told by your health care provider. This will help prevent infection and keep the dressing from sticking to the wound.  Have the sutures or staples removed as told by your health care provider. Do not remove sutures or staples yourself.  If skin adhesive strips were used:    Do not get the skin adhesive strips wet. You may shower or bathe, but keep the wound dry.  If the wound gets wet, pat it dry with a clean towel. Do not rub the wound.  Skin adhesive strips fall off on their own. If adhesive strip edges start to loosen and curl up, you may trim the loose edges. Do not remove adhesive strips completely unless your health care provider tells you to do that.  If skin glue was used:    You may shower or bathe, but try to keep the wound dry. Do not soak the wound in water.  After showering or bathing, pat the wound dry with a clean towel. Do not rub the wound.  Do not do any activities that will make you sweat a lot until the skin glue has fallen off.  Do not apply liquid, cream, or ointment medicine to the wound while the skin glue is in place. Doing this may loosen the film before the wound has healed.  If a dressing is placed over the wound, do not apply tape directly over the skin glue. Doing this may cause the glue to be pulled off before the wound has healed.  Do not pick at the glue. Skin glue usually remains in place for 5–10 days and then falls off the skin.  Follow these instructions at home:  Medicines    Take over-the-counter and prescription medicines only as told by your health care provider.  If you were prescribed an antibiotic medicine or ointment, take or apply it as told by your health care provider. Do not stop using it even if your condition improves.  Managing pain and swelling    If directed, put ice on the injured area. To do this:  Put ice in a plastic bag.  Place a towel between your skin and the bag.  Leave the ice on for 20 minutes, 2–3 times a day.  Remove the ice if your skin turns bright red. This is very important. If you cannot feel pain, heat, or cold, you have a greater risk of damage to the area.  Raise (elevate) the injured area above the level of your heart while you are sitting or lying down for the first 24–48 hours after the laceration is repaired.  General instructions    Two wounds closed with skin glue. One is normal. The other is red with pus and infected.  Avoid any activity that could cause your wound to reopen.  Check your wound every day for signs of infection. Watch for:  More redness, swelling, or pain.  Fluid or blood.  Warmth.  Pus or a bad smell.  Keep all follow-up visits. This is important.  Contact a health care provider if:  You received a tetanus shot and you have swelling, severe pain, redness, or bleeding at the injection site.  Your closed wound breaks open.  You have any of these signs of infection:  More redness, swelling, or pain around your wound.  Fluid or blood coming from your wound.  Warmth coming from your wound.  Pus or a bad smell coming from your wound.  A fever.  You notice something coming out of the wound, such as wood or glass.  Your pain is not controlled with medicine.  You notice a change in the color of your skin near your wound.  You need to change the dressing often.  You develop a new rash.  You have numbness around the wound.  Get help right away if:  You develop severe swelling around the wound.  Your pain suddenly increases and is severe.  You develop painful lumps near the wound or on skin anywhere else on your body.  You have a red streak going away from your wound.  The wound is on your hand or foot, and you cannot properly move a finger or toe.  The wound is on your hand or foot, and you notice that your fingers or toes look pale or bluish.  Summary  A laceration is a cut that may go through all layers of the skin and into the tissue that is right under the skin.  Some lacerations heal on their own. Others need to be closed with stitches (sutures), staples, skin adhesive strips, or skin glue.  Proper care of a laceration reduces the risk of infection, helps the laceration heal better, and may prevent scarring.  This information is not intended to replace advice given to you by your health care provider. Make sure you discuss any questions you have with your health care provider.

## 2023-10-11 NOTE — ED ADULT TRIAGE NOTE - CHIEF COMPLAINT QUOTE
Pt stated she was at work last Friday accidentally cut her left hand, and it started bleeding today, no active bleeding noted.

## 2023-10-11 NOTE — ED ADULT NURSE NOTE - DOES PATIENT HAVE ADVANCE DIRECTIVE
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 Prior to [facility] arrival she had her blood drawn and was found to have a Hgb of 6.6. At that time her PCP instructed her to go to the emergency department for a blood transfusion. She is s/p 1unit Platelet transfusion - developed transfusion reaction during this - received IM epi, Benadryl/ 125 methylpred for concerns of throat swelling per Dr. Gonzalez. 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 [employer] pending.    - Transfuse Hb <7 and platelets <10.            No

## 2023-10-11 NOTE — ED PROVIDER NOTE - NSFOLLOWUPCLINICS_GEN_ALL_ED_FT
[facility]  Internal Medicine  [address] [place] [address]  Phone: [phone]  Fax: [phone]  Follow Up Time: 1-3 Days

## 2023-10-11 NOTE — ED ADULT NURSE NOTE - NSFALLUNIVINTERV_ED_ALL_ED
Bed/Stretcher in lowest position, wheels locked, appropriate side rails in place/Call bell, personal items and telephone in reach/Instruct patient to call for assistance before getting out of bed/chair/stretcher/Non-slip footwear applied when patient is off stretcher/[place] to call system/Physically safe environment - no spills, clutter or unnecessary equipment/Purposeful proactive rounding/Room/bathroom lighting operational, light cord in reach

## 2023-10-11 NOTE — ED PROVIDER NOTE - CLINICAL SUMMARY MEDICAL DECISION MAKING FREE TEXT BOX
23 female with no known medical problems presenting to the ED with left hand laceration ~1 week ago that minimally reopened/dehisced.  No evidence of infection/cellulitis at this time.  Wound well-healing.    Vitals stable.    Wound care provided.     Pt advised regarding need for close outpatient follow up.  Patient stable for further care in outpatient setting. No indication for inpatient admission at this time. Patient advised regarding symptomatic & supportive care and symptoms to prompt ED return. Strict return precautions provided.

## 2023-10-11 NOTE — ED PROVIDER NOTE - PATIENT PORTAL LINK FT
You can access the FollowMyHealth Patient Portal offered by [facility] by registering at the following website: [email or web address]. By joining [employer]’s FollowMyHealth portal, you will also be able to view your health information using other applications (apps) compatible with our system.

## 2023-10-11 NOTE — ED PROVIDER NOTE - NS ED ROS FT
Constitutional: (-) fever (-) chills  Musculoskeletal: (-) myalgias  Skin: (+) laceration  Neurological: (-) weakness (-) numbness

## 2023-12-27 ENCOUNTER — EMERGENCY (EMERGENCY)
Facility: HOSPITAL | Age: 23
LOS: 1 days | Discharge: HOME | End: 2023-12-27
Attending: EMERGENCY MEDICINE
Payer: SELF-PAY

## 2023-12-27 VITALS
WEIGHT: 110.23 LBS | OXYGEN SATURATION: 100 % | TEMPERATURE: 98 F | DIASTOLIC BLOOD PRESSURE: 65 MMHG | RESPIRATION RATE: 16 BRPM | HEART RATE: 92 BPM | HEIGHT: 62.99 IN | SYSTOLIC BLOOD PRESSURE: 101 MMHG

## 2023-12-27 DIAGNOSIS — Z98.890 OTHER SPECIFIED POSTPROCEDURAL STATES: Chronic | ICD-10-CM

## 2023-12-27 DIAGNOSIS — Z98.84 BARIATRIC SURGERY STATUS: Chronic | ICD-10-CM

## 2023-12-27 PROCEDURE — 70486 CT MAXILLOFACIAL W/O DYE: CPT | Mod: MA

## 2023-12-27 PROCEDURE — 99284 EMERGENCY DEPT VISIT MOD MDM: CPT

## 2023-12-27 PROCEDURE — 70486 CT MAXILLOFACIAL W/O DYE: CPT | Mod: 26,MA

## 2023-12-27 PROCEDURE — 70450 CT HEAD/BRAIN W/O DYE: CPT | Mod: MA

## 2023-12-27 PROCEDURE — 99053 MED SERV 10PM-8AM 24 HR FAC: CPT

## 2023-12-27 PROCEDURE — 99284 EMERGENCY DEPT VISIT MOD MDM: CPT | Mod: 25

## 2023-12-27 PROCEDURE — 70450 CT HEAD/BRAIN W/O DYE: CPT | Mod: 26,MA

## 2023-12-27 RX ORDER — ACETAMINOPHEN 500 MG
975 TABLET ORAL ONCE
Refills: 0 | Status: COMPLETED | OUTPATIENT
Start: 2023-12-27 | End: 2023-12-27

## 2023-12-27 NOTE — ED ADULT NURSE NOTE - NSFALLUNIVINTERV_ED_ALL_ED
Bed/Stretcher in lowest position, wheels locked, appropriate side rails in place/Call bell, personal items and telephone in reach/Instruct patient to call for assistance before getting out of bed/chair/stretcher/Non-slip footwear applied when patient is off stretcher/[place] to call system/Physically safe environment - no spills, clutter or unnecessary equipment/Purposeful proactive rounding/Room/bathroom lighting operational, light cord in reach Bed/Stretcher in lowest position, wheels locked, appropriate side rails in place/Call bell, personal items and telephone in reach/Instruct patient to call for assistance before getting out of bed/chair/stretcher/Non-slip footwear applied when patient is off stretcher/[place] to call system/Physically safe environment - no spills, clutter or unnecessary equipment/Purposeful proactive rounding/Room/bathroom lighting operational, light cord in reach

## 2023-12-27 NOTE — ED PROVIDER NOTE - PATIENT PORTAL LINK FT
You can access the FollowMyHealth Patient Portal offered by [facility] by registering at the following website: [email or web address]. By joining [employer]’s FollowMyHealth portal, you will also be able to view your health information using other applications (apps) compatible with our system. You can access the FollowMyHealth Patient Portal offered by [facility] by registering at the following website: [email or web address]. By joining [employer]’s FollowMyHealth portal, you will also be able to view your health information using other applications (apps) compatible with our system.
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
## 2023-12-27 NOTE — ED PROVIDER NOTE - NSFOLLOWUPINSTRUCTIONS_ED_ALL_ED_FT
Facial or Scalp Contusion  A facial or scalp contusion is a bruise (contusion) on the face or head. Contusions are the result of a direct force (blunttrauma) to the face or scalp that has caused bleeding under the skin. The contusion may turn blue, purple, or yellow (discoloration). Minor injuries may cause a painless contusion, but more severe contusions may stay painful and swollen for a few weeks.    Injuries to the face and head generally cause a lot of swelling and discoloration, especially around the eyes. Contusions by themselves are generally not life threatening. You may have a contusion along with other injuries, such as broken bones (fractures), cuts, and injuries to blood vessels.    What are the causes?  A facial or scalp contusion is caused by a injury, fall, or trauma to the face or head area. Contusions may result from:  Motor vehicle accidents.  Sports injuries.  Assault injuries.  What are the signs or symptoms?  Symptoms of this condition include:  Swelling of the injured area. The swelling may be in a small area (localized) and very noticeable.  Discoloration of the injured area.  Tenderness, soreness, or pain in the injured area.  In addition to symptoms of contusions, if you have facial fractures, you may have a change in the appearance of your nose, may not be able to close your mouth, and may have vision changes.    How is this diagnosed?  This condition is diagnosed based on your medical history and a physical exam. An X-ray exam, CT scan, or MRI may be needed to check for any additional injuries. In some cases, your health care provider may need to do more examinations of your nose, eyes, or jaw.    How is this treated?  Often, the best treatment for a facial or scalp contusion is applying cold compresses to the injured area. Over-the-counter medicines may also be recommended to help relieve pain.    If there are any cuts (lacerations), these will need to be repaired as well. Any deeper injuries may require treatment and follow up with a specialist, such as a facial surgeon or an eye specialist (ophthalmologist).    Follow these instructions at home:  Managing pain, stiffness, and swelling    Bag of ice on a towel on the skin.   If directed, put ice on the injured area. To do this:  Put ice in a plastic bag.  Place a towel between your skin and the bag.  Leave the ice on for 20 minutes, 2–3 times a day.  Remove the ice if your skin turns bright red. This is very important. If you cannot feel pain, heat, or cold, you have a greater risk of damage to the area.  Raise (elevate) the injured area above the level of your heart while you are sitting or lying down.  General instructions    Take over-the-counter and prescription medicines only as told by your health care provider.  Rest as told by your health care provider.  Return to your normal activities as told by your health care provider. Ask your health care provider what activities are safe for you.  Do not blow your nose if you have any facial fractures.  Eat soft foods if you are having jaw pain.  Keep all follow-up visits. This is important.  Contact a health care provider if:  You have trouble biting or chewing.  Your pain or swelling gets worse.  The discolored area gets much worse.  Get help right away if:  You have severe pain or a headache that is not relieved by medicine.  You have unusual sleepiness, confusion, or personality changes.  You vomit.  You have a nosebleed that does not stop.  You have double vision or blurred vision.  You have clear fluid draining from your nose or ear, and it does not go away.  You have trouble walking or using your arms or legs.  You have severe dizziness.  Summary  A facial or scalp contusion is a bruise (contusion) on the face or head.  Contusions are the result of an injury that caused bleeding and swelling under the skin.  Minor contusions will have mild symptoms, but more severe contusions may stay painful and swollen for a few weeks.  Some facial and head contusions may be associated with other more serious injuries. Go to a health care provider if you have vision changes, bleeding from your face or nose, or you are not able to to bite down normally.  Often, the best treatment for a facial or scalp contusion is applying cold compresses to the injured area.  This information is not intended to replace advice given to you by your health care provider. Make sure you discuss any questions you have with your health care provider.    Document Revised: 01/24/2022 Document Reviewed: 01/24/2022  [employer] Patient Education © 2023 [employer]  [employer] logo  Terms and Conditions  Privacy Policy  Editorial Policy  All content on this site: Copyright © 2023 [employer], its licensors, and contributors. All rights are reserved, including those for text and data mining, AI training, and similar technologies. For all open access content, the Creative Commons licensing terms apply.  Cookies are used by this site. To decline or learn more, visit our Cookies page. Facial or Scalp Contusion  A facial or scalp contusion is a bruise (contusion) on the face or head. Contusions are the result of a direct force (blunttrauma) to the face or scalp that has caused bleeding under the skin. The contusion may turn blue, purple, or yellow (discoloration). Minor injuries may cause a painless contusion, but more severe contusions may stay painful and swollen for a few weeks.    Injuries to the face and head generally cause a lot of swelling and discoloration, especially around the eyes. Contusions by themselves are generally not life threatening. You may have a contusion along with other injuries, such as broken bones (fractures), cuts, and injuries to blood vessels.    What are the causes?  A facial or scalp contusion is caused by a injury, fall, or trauma to the face or head area. Contusions may result from:  Motor vehicle accidents.  Sports injuries.  Assault injuries.  What are the signs or symptoms?  Symptoms of this condition include:  Swelling of the injured area. The swelling may be in a small area (localized) and very noticeable.  Discoloration of the injured area.  Tenderness, soreness, or pain in the injured area.  In addition to symptoms of contusions, if you have facial fractures, you may have a change in the appearance of your nose, may not be able to close your mouth, and may have vision changes.    How is this diagnosed?  This condition is diagnosed based on your medical history and a physical exam. An X-ray exam, CT scan, or MRI may be needed to check for any additional injuries. In some cases, your health care provider may need to do more examinations of your nose, eyes, or jaw.    How is this treated?  Often, the best treatment for a facial or scalp contusion is applying cold compresses to the injured area. Over-the-counter medicines may also be recommended to help relieve pain.    If there are any cuts (lacerations), these will need to be repaired as well. Any deeper injuries may require treatment and follow up with a specialist, such as a facial surgeon or an eye specialist (ophthalmologist).    Follow these instructions at home:  Managing pain, stiffness, and swelling    Bag of ice on a towel on the skin.   If directed, put ice on the injured area. To do this:  Put ice in a plastic bag.  Place a towel between your skin and the bag.  Leave the ice on for 20 minutes, 2–3 times a day.  Remove the ice if your skin turns bright red. This is very important. If you cannot feel pain, heat, or cold, you have a greater risk of damage to the area.  Raise (elevate) the injured area above the level of your heart while you are sitting or lying down.  General instructions    Take over-the-counter and prescription medicines only as told by your health care provider.  Rest as told by your health care provider.  Return to your normal activities as told by your health care provider. Ask your health care provider what activities are safe for you.  Do not blow your nose if you have any facial fractures.  Eat soft foods if you are having jaw pain.  Keep all follow-up visits. This is important.  Contact a health care provider if:  You have trouble biting or chewing.  Your pain or swelling gets worse.  The discolored area gets much worse.  Get help right away if:  You have severe pain or a headache that is not relieved by medicine.  You have unusual sleepiness, confusion, or personality changes.  You vomit.  You have a nosebleed that does not stop.  You have double vision or blurred vision.  You have clear fluid draining from your nose or ear, and it does not go away.  You have trouble walking or using your arms or legs.  You have severe dizziness.  Summary  A facial or scalp contusion is a bruise (contusion) on the face or head.  Contusions are the result of an injury that caused bleeding and swelling under the skin.  Minor contusions will have mild symptoms, but more severe contusions may stay painful and swollen for a few weeks.  Some facial and head contusions may be associated with other more serious injuries. Go to a health care provider if you have vision changes, bleeding from your face or nose, or you are not able to to bite down normally.  Often, the best treatment for a facial or scalp contusion is applying cold compresses to the injured area.  This information is not intended to replace advice given to you by your health care provider. Make sure you discuss any questions you have with your health care provider.    Document Revised: 01/24/2022 Document Reviewed: 01/24/2022  [employer] Patient Education © 2023 [employer]  [employer] logo  Terms and Conditions  Privacy Policy  Editorial Policy  All content on this site: Copyright © 2023 [employer], its licensors, and contributors. All rights are reserved, including those for text and data mining, AI training, and similar technologies. For all open access content, the Creative Commons licensing terms apply.  Cookies are used by this site. To decline or learn more, visit our Cookies page.
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
## 2024-01-14 ENCOUNTER — EMERGENCY (EMERGENCY)
Facility: HOSPITAL | Age: 24
LOS: 1 days | Discharge: LEFT BEFORE DISCHARGE | End: 2024-01-14
Payer: MEDICAID

## 2024-01-14 VITALS
DIASTOLIC BLOOD PRESSURE: 66 MMHG | HEIGHT: 62.99 IN | OXYGEN SATURATION: 98 % | RESPIRATION RATE: 18 BRPM | TEMPERATURE: 98 F | WEIGHT: 110.23 LBS | HEART RATE: 97 BPM | SYSTOLIC BLOOD PRESSURE: 108 MMHG

## 2024-01-14 DIAGNOSIS — Z98.890 OTHER SPECIFIED POSTPROCEDURAL STATES: Chronic | ICD-10-CM

## 2024-01-14 DIAGNOSIS — Z98.84 BARIATRIC SURGERY STATUS: Chronic | ICD-10-CM

## 2024-01-14 PROCEDURE — L9991: CPT
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
## 2024-04-15 ENCOUNTER — EMERGENCY (EMERGENCY)
Facility: HOSPITAL | Age: 24
LOS: 1 days | Discharge: LEFT BEFORE DISCHARGE | End: 2024-04-15
Payer: MEDICAID

## 2024-04-15 DIAGNOSIS — Z98.84 BARIATRIC SURGERY STATUS: Chronic | ICD-10-CM

## 2024-04-15 DIAGNOSIS — Z98.890 OTHER SPECIFIED POSTPROCEDURAL STATES: Chronic | ICD-10-CM

## 2024-04-15 PROCEDURE — L9992: CPT

## 2024-04-15 NOTE — ED ADULT NURSE NOTE - NS_ED_CALLED_X 1
HISTORY OF PRESENT ILLNESS  Brett Fuller is a 15 y.o. female. HPI NEW patient referral by Dr. Rosemary Hall for a palpable breast lump found by her pediatrician on her annual physical. The patient states it was slightly tender when the doctor palpated the lump. Patient does not feel a mass. She thought it was in the lower outer breast.   The patient denies any nipple inversion or discharge  OB History     Obstetric Comments    Menarche 15, LMP 1/4/2019, # of children na, age of 1st delivery na, Hysterectomy/oophorectomy no/no, Breast bx none, history of breast feeding no, BCP none, Hormone therapy none      . Family History - maternal grandmother - breast cancer. Paternal grandmother - cervical cancer. No imaging    Review of Systems   Constitutional: Negative. HENT: Negative. Eyes: Negative. Respiratory: Negative. Cardiovascular: Negative. Gastrointestinal: Negative. Genitourinary: Negative. Musculoskeletal: Negative. Skin: Negative. Neurological: Negative. Endo/Heme/Allergies: Negative. Psychiatric/Behavioral: Negative. Physical Exam   Pulmonary/Chest: Right breast exhibits no mass, no nipple discharge, no skin change and no tenderness. Left breast exhibits mass (nodular breat tissue LOQ). Left breast exhibits no nipple discharge, no skin change and no tenderness. Breasts are symmetrical.       Lymphadenopathy:     She has no cervical adenopathy. She has no axillary adenopathy. Right: No supraclavicular adenopathy present. Left: No supraclavicular adenopathy present. BREAST ULTRASOUND  Indication: Left  breast masses LOQ  Technique: The area was scanned using a high-frequency linear-array near-field transducer  Findings: No abnormal mass, lesion, or shadowing noted.   No cysts or masses  Impression: Normal dense breast tissue corresponds with nodular breast tissue  Disposition: No worrisome finding on ultrasound  ASSESSMENT and PLAN    ICD-10-CM ICD-9-CM    1. Breast mass, left N63.20 611.72      Nodular breast tissue. No cyst or fibroadenoma.   PRN follow up if any change on physical exam. 15-Apr-2024 17:35
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
